# Patient Record
Sex: FEMALE | Race: WHITE | NOT HISPANIC OR LATINO | Employment: OTHER | URBAN - METROPOLITAN AREA
[De-identification: names, ages, dates, MRNs, and addresses within clinical notes are randomized per-mention and may not be internally consistent; named-entity substitution may affect disease eponyms.]

---

## 2018-02-20 ENCOUNTER — HOSPITAL ENCOUNTER (EMERGENCY)
Facility: HOSPITAL | Age: 43
Discharge: HOME | End: 2018-02-20
Attending: EMERGENCY MEDICINE
Payer: MEDICAID

## 2018-02-20 VITALS
HEART RATE: 108 BPM | SYSTOLIC BLOOD PRESSURE: 154 MMHG | HEIGHT: 61 IN | DIASTOLIC BLOOD PRESSURE: 82 MMHG | RESPIRATION RATE: 16 BRPM | BODY MASS INDEX: 28.32 KG/M2 | TEMPERATURE: 99 F | OXYGEN SATURATION: 99 % | WEIGHT: 150 LBS

## 2018-02-20 DIAGNOSIS — J02.9 PHARYNGITIS, UNSPECIFIED ETIOLOGY: Primary | ICD-10-CM

## 2018-02-20 DIAGNOSIS — H66.91 RIGHT OTITIS MEDIA, UNSPECIFIED OTITIS MEDIA TYPE: ICD-10-CM

## 2018-02-20 LAB — DEPRECATED S PYO AG THROAT QL EIA: NEGATIVE

## 2018-02-20 PROCEDURE — 87880 STREP A ASSAY W/OPTIC: CPT

## 2018-02-20 PROCEDURE — 99284 EMERGENCY DEPT VISIT MOD MDM: CPT | Mod: ,,, | Performed by: EMERGENCY MEDICINE

## 2018-02-20 PROCEDURE — 25000003 PHARM REV CODE 250: Performed by: EMERGENCY MEDICINE

## 2018-02-20 PROCEDURE — 87081 CULTURE SCREEN ONLY: CPT

## 2018-02-20 PROCEDURE — 99283 EMERGENCY DEPT VISIT LOW MDM: CPT

## 2018-02-20 RX ORDER — AMOXICILLIN 400 MG/5ML
500 POWDER, FOR SUSPENSION ORAL 3 TIMES DAILY
Qty: 200 ML | Refills: 0 | Status: SHIPPED | OUTPATIENT
Start: 2018-02-20 | End: 2018-03-02

## 2018-02-20 RX ORDER — IBUPROFEN 400 MG/1
400 TABLET ORAL
Status: COMPLETED | OUTPATIENT
Start: 2018-02-20 | End: 2018-02-20

## 2018-02-20 RX ORDER — ACETAMINOPHEN 500 MG
1000 TABLET ORAL
Status: COMPLETED | OUTPATIENT
Start: 2018-02-20 | End: 2018-02-20

## 2018-02-20 RX ORDER — AMOXICILLIN 500 MG/1
500 CAPSULE ORAL 3 TIMES DAILY
Qty: 21 CAPSULE | Refills: 0 | Status: SHIPPED | OUTPATIENT
Start: 2018-02-20 | End: 2018-02-20 | Stop reason: ALTCHOICE

## 2018-02-20 RX ADMIN — ACETAMINOPHEN 1000 MG: 500 TABLET ORAL at 08:02

## 2018-02-20 RX ADMIN — IBUPROFEN 400 MG: 400 TABLET, FILM COATED ORAL at 08:02

## 2018-02-21 NOTE — ED NOTES
Patient unable to swallow second half of acetaminophen and ibuprofen medications. Pt received 500 mg acetaminophen.

## 2018-02-21 NOTE — ED PROVIDER NOTES
Encounter Date: 2/20/2018    SCRIBE #1 NOTE: I, Ayesha Carlos am scribing for, and in the presence of,  Dr. Pickering. I have scribed the following portions of the note - Other sections scribed: HPI, ROS, PE .       History     Chief Complaint   Patient presents with    Sore Throat     Pt reports having sore throat and ear pain since Thursday    Otalgia     Time patient was seen by the provider: 8:38 PM      The patient is a 42 y.o. female with medical conditions including DM and asthma who presents to the ED with a complaint of sore throat x5 days and right ear pain for a few days. Patient reports assoiated post-nasal drip consisting of green mucous as well as sneezing .She reports tinnitus and mild loss of balance associated with her ear pain.       The history is provided by the patient and medical records.     Review of patient's allergies indicates:   Allergen Reactions    Pseudoephedrine Other (See Comments)     Heart racing, dizzy     History reviewed. No pertinent past medical history.  History reviewed. No pertinent surgical history.  History reviewed. No pertinent family history.  Social History   Substance Use Topics    Smoking status: Never Smoker    Smokeless tobacco: Never Used    Alcohol use No     Review of Systems   Constitutional: Negative for chills and fatigue.   HENT: Positive for ear pain, postnasal drip, sneezing, sore throat and tinnitus.         (+) disequilibirum   Gastrointestinal: Negative for abdominal pain.   Neurological: Negative for headaches.       Physical Exam     Initial Vitals [02/20/18 2011]   BP Pulse Resp Temp SpO2   (!) 154/82 108 16 99 °F (37.2 °C) 99 %      MAP       106         Physical Exam    Nursing note and vitals reviewed.  Constitutional: No distress.   Patient is comfortable and well-appearing.    HENT:   Left Ear: Tympanic membrane normal.   Mouth/Throat: Mucous membranes are normal. Oropharyngeal exudate present.   No nasal congestion. Tonsils are  erythematous bilaterally. Uvula is midline. There is no trismus, drooling, or stridor. Left TM is clear. Right TM is erythematous, there is drainage in the canal,    Neck: Neck supple.   No significant lymphadenopathy.         ED Course   Procedures  Labs Reviewed   THROAT SCREEN, RAPID   CULTURE, STREP A,  THROAT             Medical Decision Making:   History:   Old Medical Records: I decided to obtain old medical records.  Initial Assessment:   43 yo healthy f, with URI sx x 5 days, but not primarily with sore throat and R ear pain.  On exam, exudative tonsillitis but uvula midline, no voice change, neck supple.  Also R OM  Differential Diagnosis:   Pharyngitis and R OM  -rapid strep negative  -likely viral  -will give abx rx - advise pt that she can do a WASP  Clinical Tests:   Lab Tests: Ordered and Reviewed            Scribe Attestation:   Scribe #1: I performed the above scribed service and the documentation accurately describes the services I performed. I attest to the accuracy of the note.               Clinical Impression:   The primary encounter diagnosis was Pharyngitis, unspecified etiology. A diagnosis of Right otitis media, unspecified otitis media type was also pertinent to this visit.    Disposition:   Disposition: Discharged  Condition: Stable    I, Dr. Estelle Pickering, personally performed the services described in this documentation. All medical record entries made by the scribe were at my direction and in my presence.  I have reviewed the chart and agree that the record reflects my personal performance and is accurate and complete. Estelle Pickering MD.  6:35 PM 02/22/2018                        Estelle Pickering MD  02/22/18 [id number]

## 2018-02-21 NOTE — ED NOTES
Patient identifiers verified and correct for Ms Pineda  C/C: Sore throat, ear pain   APPEARANCE: awake and alert in NAD.  SKIN: warm, dry and intact. No breakdown or bruising.  MUSCULOSKELETAL: Patient moving all extremities spontaneously, no obvious swelling or deformities noted. Ambulates independently.  RESPIRATORY: Denies shortness of breath.Respirations unlabored.   CARDIAC: Denies CP, 2+ distal pulses; no peripheral edema  ABDOMEN: S/ND/NT, Denies nausea  : voids spontaneously, denies difficulty  Neurologic: AAO x 4; follows commands equal strength in all extremities; denies numbness/tingling. Denies dizziness Denies weakness, pain to right ear, sore throat with swallowing. Pain worse with sneezing.

## 2018-02-23 LAB — BACTERIA THROAT CULT: NORMAL
